# Patient Record
Sex: FEMALE | URBAN - METROPOLITAN AREA
[De-identification: names, ages, dates, MRNs, and addresses within clinical notes are randomized per-mention and may not be internally consistent; named-entity substitution may affect disease eponyms.]

---

## 2024-04-03 ENCOUNTER — LAB ENCOUNTER (OUTPATIENT)
Dept: LAB | Age: 69
End: 2024-04-03
Attending: PHYSICIAN ASSISTANT
Payer: MEDICARE

## 2024-04-03 ENCOUNTER — OFFICE VISIT (OUTPATIENT)
Dept: INTERNAL MEDICINE CLINIC | Facility: CLINIC | Age: 69
End: 2024-04-03
Payer: MEDICARE

## 2024-04-03 VITALS
OXYGEN SATURATION: 94 % | SYSTOLIC BLOOD PRESSURE: 118 MMHG | HEART RATE: 66 BPM | BODY MASS INDEX: 50.02 KG/M2 | HEIGHT: 63.98 IN | RESPIRATION RATE: 18 BRPM | DIASTOLIC BLOOD PRESSURE: 78 MMHG | WEIGHT: 293 LBS

## 2024-04-03 DIAGNOSIS — E78.5 HYPERLIPIDEMIA, UNSPECIFIED HYPERLIPIDEMIA TYPE: ICD-10-CM

## 2024-04-03 DIAGNOSIS — I10 HYPERTENSION, UNSPECIFIED TYPE: ICD-10-CM

## 2024-04-03 DIAGNOSIS — R73.01 IFG (IMPAIRED FASTING GLUCOSE): ICD-10-CM

## 2024-04-03 DIAGNOSIS — G47.33 OSA (OBSTRUCTIVE SLEEP APNEA): ICD-10-CM

## 2024-04-03 DIAGNOSIS — E55.9 VITAMIN D DEFICIENCY: ICD-10-CM

## 2024-04-03 DIAGNOSIS — E66.01 CLASS 3 SEVERE OBESITY WITH SERIOUS COMORBIDITY AND BODY MASS INDEX (BMI) OF 50.0 TO 59.9 IN ADULT, UNSPECIFIED OBESITY TYPE (HCC): ICD-10-CM

## 2024-04-03 DIAGNOSIS — Z91.89 AT INCREASED RISK FOR CARDIOVASCULAR DISEASE: ICD-10-CM

## 2024-04-03 DIAGNOSIS — Z51.81 ENCOUNTER FOR THERAPEUTIC DRUG LEVEL MONITORING: Primary | ICD-10-CM

## 2024-04-03 DIAGNOSIS — Z51.81 ENCOUNTER FOR THERAPEUTIC DRUG LEVEL MONITORING: ICD-10-CM

## 2024-04-03 LAB
ALBUMIN SERPL-MCNC: 3.5 G/DL (ref 3.4–5)
ALBUMIN/GLOB SERPL: 0.9 {RATIO} (ref 1–2)
ALP LIVER SERPL-CCNC: 55 U/L
ALT SERPL-CCNC: 23 U/L
ANION GAP SERPL CALC-SCNC: 5 MMOL/L (ref 0–18)
AST SERPL-CCNC: 15 U/L (ref 15–37)
BASOPHILS # BLD AUTO: 0.09 X10(3) UL (ref 0–0.2)
BASOPHILS NFR BLD AUTO: 0.9 %
BILIRUB SERPL-MCNC: 0.8 MG/DL (ref 0.1–2)
BUN BLD-MCNC: 20 MG/DL (ref 9–23)
CALCIUM BLD-MCNC: 9 MG/DL (ref 8.5–10.1)
CHLORIDE SERPL-SCNC: 108 MMOL/L (ref 98–112)
CHOLEST SERPL-MCNC: 133 MG/DL (ref ?–200)
CO2 SERPL-SCNC: 26 MMOL/L (ref 21–32)
CREAT BLD-MCNC: 1.08 MG/DL
EGFRCR SERPLBLD CKD-EPI 2021: 56 ML/MIN/1.73M2 (ref 60–?)
EOSINOPHIL # BLD AUTO: 0.36 X10(3) UL (ref 0–0.7)
EOSINOPHIL NFR BLD AUTO: 3.7 %
ERYTHROCYTE [DISTWIDTH] IN BLOOD BY AUTOMATED COUNT: 12.7 %
EST. AVERAGE GLUCOSE BLD GHB EST-MCNC: 134 MG/DL (ref 68–126)
FASTING PATIENT LIPID ANSWER: YES
FASTING STATUS PATIENT QL REPORTED: YES
GLOBULIN PLAS-MCNC: 4 G/DL (ref 2.8–4.4)
GLUCOSE BLD-MCNC: 113 MG/DL (ref 70–99)
HBA1C MFR BLD: 6.3 % (ref ?–5.7)
HCT VFR BLD AUTO: 50 %
HDLC SERPL-MCNC: 48 MG/DL (ref 40–59)
HGB BLD-MCNC: 16.7 G/DL
IMM GRANULOCYTES # BLD AUTO: 0.05 X10(3) UL (ref 0–1)
IMM GRANULOCYTES NFR BLD: 0.5 %
LDLC SERPL CALC-MCNC: 64 MG/DL (ref ?–100)
LYMPHOCYTES # BLD AUTO: 2.15 X10(3) UL (ref 1–4)
LYMPHOCYTES NFR BLD AUTO: 21.9 %
MCH RBC QN AUTO: 31.5 PG (ref 26–34)
MCHC RBC AUTO-ENTMCNC: 33.4 G/DL (ref 31–37)
MCV RBC AUTO: 94.2 FL
MONOCYTES # BLD AUTO: 0.74 X10(3) UL (ref 0.1–1)
MONOCYTES NFR BLD AUTO: 7.6 %
NEUTROPHILS # BLD AUTO: 6.41 X10 (3) UL (ref 1.5–7.7)
NEUTROPHILS # BLD AUTO: 6.41 X10(3) UL (ref 1.5–7.7)
NEUTROPHILS NFR BLD AUTO: 65.4 %
NONHDLC SERPL-MCNC: 85 MG/DL (ref ?–130)
OSMOLALITY SERPL CALC.SUM OF ELEC: 291 MOSM/KG (ref 275–295)
PLATELET # BLD AUTO: 301 10(3)UL (ref 150–450)
POTASSIUM SERPL-SCNC: 4.3 MMOL/L (ref 3.5–5.1)
PROT SERPL-MCNC: 7.5 G/DL (ref 6.4–8.2)
RBC # BLD AUTO: 5.31 X10(6)UL
SODIUM SERPL-SCNC: 139 MMOL/L (ref 136–145)
T4 FREE SERPL-MCNC: 1.1 NG/DL (ref 0.8–1.7)
TRIGL SERPL-MCNC: 119 MG/DL (ref 30–149)
TSI SER-ACNC: 3.62 MIU/ML (ref 0.36–3.74)
VIT B12 SERPL-MCNC: 480 PG/ML (ref 193–986)
VIT D+METAB SERPL-MCNC: 36.4 NG/ML (ref 30–100)
VLDLC SERPL CALC-MCNC: 18 MG/DL (ref 0–30)
WBC # BLD AUTO: 9.8 X10(3) UL (ref 4–11)

## 2024-04-03 PROCEDURE — 85025 COMPLETE CBC W/AUTO DIFF WBC: CPT

## 2024-04-03 PROCEDURE — 36415 COLL VENOUS BLD VENIPUNCTURE: CPT

## 2024-04-03 PROCEDURE — 80053 COMPREHEN METABOLIC PANEL: CPT

## 2024-04-03 PROCEDURE — 82607 VITAMIN B-12: CPT

## 2024-04-03 PROCEDURE — 82306 VITAMIN D 25 HYDROXY: CPT

## 2024-04-03 PROCEDURE — 84439 ASSAY OF FREE THYROXINE: CPT

## 2024-04-03 PROCEDURE — 99204 OFFICE O/P NEW MOD 45 MIN: CPT | Performed by: PHYSICIAN ASSISTANT

## 2024-04-03 PROCEDURE — 83036 HEMOGLOBIN GLYCOSYLATED A1C: CPT

## 2024-04-03 PROCEDURE — 84443 ASSAY THYROID STIM HORMONE: CPT

## 2024-04-03 PROCEDURE — 80061 LIPID PANEL: CPT

## 2024-04-03 RX ORDER — OXYBUTYNIN CHLORIDE 10 MG/1
10 TABLET, EXTENDED RELEASE ORAL DAILY
COMMUNITY

## 2024-04-03 NOTE — PROGRESS NOTES
HISTORY OF PRESENT ILLNESS  Chief Complaint   Patient presents with    Weight Problem     Ref by a friend, no prior [facility] management, open to meds       Cecelia Nina is a 68 year old female new to our office today for initiation of medical weight loss program.  Patient presents today with c/o excess weight.       Not getting around like she would like to  Fatigue  Difficulty with walking from one place to the next   Gradual weight gain over the years  Retired when she was 54  Sedentary lifestyle  In summer time will swim    Was trying a type of shots for 4 weeks, then  got sick and had to have surgery    Daughter just moved home with her 12 yr old son and does all the cooking    Goes to [place] in the winter typically    Reason/goal for weight loss: improve endurance and mobility, lose and maintain weight loss    Previous weight loss efforts in the past/medication: dietary and exercise    Interest in Bariatric surgery:     Barriers to weight loss:     Wt Readings from Last 6 Encounters:   04/03/24 (!) 308 lb (139.7 kg)   04/04/13 264 lb 9.6 oz (120 kg)          Breakfast Lunch Dinner Snacks Fluids   Typically doesn't wake up until 11 1/2 bologne sandwich    2 eggs with 2 sausage Soup    Hamburger with mac n cheese  Protein, veggie pretzels Unsweetened ice tea  Cranberry juice  Water     Social hx and lifestyle reviewed:    Work: Retired  Marital status: Yes  Support: Yes  Tobacco use: None  ETOH use: None  Supplements: Probiotic, activated charcoal, melatonin if needed  Exercise: summer swimming  Stress level: High since daughter moved back home  Sleep hours and integrity: sleeps for a few hours and then is awake, ALLI, she does not use her CPAP    MEDICAL HISTORY  PMH reviewed:   Cardiac disorders:negative    Depression/anxiety: Yes  Glaucoma: negative   Kidney stones: Yes, years ago  Eating disorder: negative   Migraines: Prior to menopause  Seizures: negative   Joint-related conditions: multiple joints, knees,  hips  Liver disease: negative   Renal disease: negative   Diabetes: negative  Thyroid disease: negative   Constipation: negative  Miscarriage: negative   DVT: negative    Family or personal history of Pancreatic issues / Medullary Thyroid Cancer: negative    History of bariatric surgery: negative     FMH reviewed: obesity in parent/s or sibling:     REVIEW OF SYSTEMS  GENERAL: feels well otherwise  NECK: denies thickening   LUNGS: denies shortness of breath with exertion, no apnea   CARDIOVASCULAR: denies chest pain on exertion , denies palpitations or pedal edema  GI: denies abdominal pain.  No N/V/D/C  MUSCULOSKELETAL: see above  NEURO: denies headaches   PSYCH: denies change in behavior or mood, denies feeling sad or depressed     EXAM  /78   Pulse 66   Resp 18   Ht 5' 3.98\" (1.625 m)   Wt (!) 308 lb (139.7 kg)   SpO2 94%   BMI 52.91 kg/m² , Percent body fat: F >32%        GENERAL: well developed, well nourished, in no apparent distress  SKIN: warm, pink, dry without rashes to exposed area   EYES: conjunctiva pink, sclera non icteric, PERRL  HEENT: atraumatic, normocephalic, O/p: Mallampati score-   NECK: supple, non tender, no adenopathy, no thyromegaly  LUNGS: CTA in all fields, breathing non labored  CARDIO: RRR without murmur, normal S1 and S2 without clicks or gallops, no pedal edema. EKG not completed in office  GI: rotund, no masses, HSM or tenderness  MUSCULOSKELETAL: grossly intact   NEURO: Oriented times three, full ROM of bilateral UE/LE  PSYCH: pleasant, cooperative, normal mood and affect    Lab Results   Component Value Date    WBC 11.0 (H) 04/04/2013    MCV 93 04/04/2013    MCH 30.9 04/04/2013    MCHC 33.1 04/04/2013    RDW 14.2 04/04/2013     (H) 04/04/2013     Lab Results   Component Value Date    BUN 16 04/04/2013    CREATSERUM 0.89 04/04/2013    ALKPHO 48 04/04/2013    AST 21 04/04/2013    ALT 25 04/04/2013    TP 7.8 04/04/2013    ALB 4.8 04/04/2013    AGRATIO 1.6 04/04/2013      04/04/2013    K 4.8 04/04/2013    CL 99 04/04/2013    CO2 20 04/04/2013     No results found for: \"EAG\", \"A1C\"  Lab Results   Component Value Date    CHOLEST 177 04/04/2013    HDL 47 04/04/2013    LDL 83 04/04/2013    VLDL 47 (H) 04/04/2013     Lab Results   Component Value Date    TSH 3.160 04/04/2013     No results found for: \"B12\", \"VITB12\"  No results found for: \"VITD\", \"QVITD\", \"VQTO22NL\"    Current Outpatient Medications on File Prior to Visit   Medication Sig Dispense Refill    oxybutynin ER 10 MG Oral Tablet 24 Hr Take 1 tablet (10 mg total) by mouth daily.      atenolol (TENORMIN) 100 MG Oral Tab Take 1 tablet (100 mg total) by mouth daily.      escitalopram (LEXAPRO) 20 MG Oral Tab Take 1 tablet (20 mg total) by mouth daily.      Esomeprazole Magnesium (NEXIUM) 40 MG Oral Capsule Delayed Release Take 1 capsule (40 mg total) by mouth every morning before breakfast. As needed       No current facility-administered medications on file prior to visit.       ASSESSMENT  Initial Weight Data and Goal Weight Loss:  Weight Calculations  Initial Weight: 308 lbs  Initial Weight Date: 04/03/24  Today's Weight: 308 lbs  5% Goal: 15.4  10% Goal: 30.8  Total Weight Loss: 0 lbs    Diagnoses and all orders for this visit:    Encounter for therapeutic drug level monitoring  -     CBC With Differential With Platelet; Future  -     Comp Metabolic Panel (14); Future  -     Hemoglobin A1C; Future  -     Lipid Panel; Future  -     TSH and Free T4; Future  -     Vitamin B12; Future  -     Vitamin D; Future  -     OP REFERRAL TO DI[facility]IAN [facility] ([facility] USE ONLY)  -     semaglutide-weight management 0.25 MG/0.5ML Subcutaneous Solution Auto-injector; Inject 0.5 mL (0.25 mg total) into the skin once a week for 4 doses.    Class 3 severe obesity with serious comorbidity and body mass index (BMI) of 50.0 to 59.9 in adult, unspecified obesity type (HCC)  -     CBC With Differential With Platelet; Future  -     Comp Metabolic  Panel (14); Future  -     Hemoglobin A1C; Future  -     Lipid Panel; Future  -     TSH and Free T4; Future  -     Vitamin B12; Future  -     Vitamin D; Future  -     OP REFERRAL TO [facility] ([facility] USE ONLY)  -     semaglutide-weight management 0.25 MG/0.5ML Subcutaneous Solution Auto-injector; Inject 0.5 mL (0.25 mg total) into the skin once a week for 4 doses.    Hypertension, unspecified type  -     CBC With Differential With Platelet; Future  -     Comp Metabolic Panel (14); Future  -     Hemoglobin A1C; Future  -     Lipid Panel; Future  -     TSH and Free T4; Future  -     Vitamin B12; Future  -     Vitamin D; Future  -     OP REFERRAL TO [facility] ([facility] USE ONLY)  -     semaglutide-weight management 0.25 MG/0.5ML Subcutaneous Solution Auto-injector; Inject 0.5 mL (0.25 mg total) into the skin once a week for 4 doses.    IFG (impaired fasting glucose)  -     CBC With Differential With Platelet; Future  -     Comp Metabolic Panel (14); Future  -     Hemoglobin A1C; Future  -     Lipid Panel; Future  -     TSH and Free T4; Future  -     Vitamin B12; Future  -     Vitamin D; Future  -     OP REFERRAL TO [facility] ([facility] USE ONLY)  -     semaglutide-weight management 0.25 MG/0.5ML Subcutaneous Solution Auto-injector; Inject 0.5 mL (0.25 mg total) into the skin once a week for 4 doses.    Hyperlipidemia, unspecified hyperlipidemia type  -     CBC With Differential With Platelet; Future  -     Comp Metabolic Panel (14); Future  -     Hemoglobin A1C; Future  -     Lipid Panel; Future  -     TSH and Free T4; Future  -     Vitamin B12; Future  -     Vitamin D; Future  -     OP REFERRAL TO [facility] ([facility] USE ONLY)  -     semaglutide-weight management 0.25 MG/0.5ML Subcutaneous Solution Auto-injector; Inject 0.5 mL (0.25 mg total) into the skin once a week for 4 doses.    ALLI (obstructive sleep apnea)  -     CBC With Differential With Platelet; Future  -     Comp Metabolic Panel (14); Future  -      Hemoglobin A1C; Future  -     Lipid Panel; Future  -     TSH and Free T4; Future  -     Vitamin B12; Future  -     Vitamin D; Future  -     OP REFERRAL TO [facility] ([facility] USE ONLY)  -     semaglutide-weight management 0.25 MG/0.5ML Subcutaneous Solution Auto-injector; Inject 0.5 mL (0.25 mg total) into the skin once a week for 4 doses.    Vitamin D deficiency  -     CBC With Differential With Platelet; Future  -     Comp Metabolic Panel (14); Future  -     Hemoglobin A1C; Future  -     Lipid Panel; Future  -     TSH and Free T4; Future  -     Vitamin B12; Future  -     Vitamin D; Future  -     OP REFERRAL TO [facility] ([facility] USE ONLY)  -     semaglutide-weight management 0.25 MG/0.5ML Subcutaneous Solution Auto-injector; Inject 0.5 mL (0.25 mg total) into the skin once a week for 4 doses.    At increased risk for cardiovascular disease  -     CBC With Differential With Platelet; Future  -     Comp Metabolic Panel (14); Future  -     Hemoglobin A1C; Future  -     Lipid Panel; Future  -     TSH and Free T4; Future  -     Vitamin B12; Future  -     Vitamin D; Future  -     OP REFERRAL TO DI[facility]IAN [facility] ([facility] USE ONLY)  -     semaglutide-weight management 0.25 MG/0.5ML Subcutaneous Solution Auto-injector; Inject 0.5 mL (0.25 mg total) into the skin once a week for 4 doses.        PLAN  Initial Weight: 308 lbs  Initial Weight Date: 04/03/24  Today's Weight: 308 lbs  5% Goal: 15.4  10% Goal: 30.8  Total Weight Loss: 0 lbs    Will begin Wegovy 0.25mg weekly - denies any personal or family history of pancreatitis, pancreatic cancer, thyroid cancer, MEN2 - discussed MOA, advised side effects and adverse effects of medication.  Discussed if Wegovy not covered will start metformin  Pt to get lab work done  HTN - blood pressure well controlled on current medication - advised signs and symptoms of hypotension and will monitor with continued weight loss  ALLI, discussed importance of CPAP compliance  HLD -  lifestyle changes, will check labs  Begin logging foods - discussed macronutrient goals  -low carb high protein  -portion control  -Limit carbohydrates  -Eat breakfast every day   -Don't skip meals   -Read nutrition labels and keep a food log  -drink a lot of water 65 oz of water per day  - Do not drink your calories (no soda, juice, high calorie coffee drinks, limit alcohol)  - Do not eat late at night  Medication use and side effects reviewed with patient.  Medication contraindications: stimulant  Follow up with dietitian and psychologist as recommended.  Discussed the role of sleep and stress in weight management.  Labs orders as above.  Counseled on comprehensive weight loss plan including attention to nutrition, exercise and behavior/stress management for success. See patient instruction below for more details.  Weight Loss Consent to treat reviewed and signed.    Total time spent on chart review, pre-charting, obtaining history, counseling, and educating, reviewing labs was 45 minutes.      Patient Instructions   1700 - 1800 calories a day  Moderate Carb (30/35/35)   133g   protein     69g   fats     155g   carbs   Lower Carb (40/40/20)   177g   protein     79g   fats     88g   carbs   We are here to support you with weight loss, but please remember that you still need your primary care provider for your routine health maintenance.      PLAN:    Follow up with me in 3 months  Schedule follow up appointments: Zoltan Butler (dietitian) or Josie Hernandez (presurgery dietitian)   Check for insurance coverage for dietitian and labwork prior to scheduling appointment.     Please try to work on the following dietary changes:  Goals: Aim for 20-30 grams of protein/ meal  Eat 4-6 vegetables/day  Avoid skipping meals- eat every 4-5 hours  Aim for 3 meals/day  2. Drink lots of water and cut down on soda/juice consumption if soda/juice drinker  3. Focus on protein: (15-30 grams with each meal) ie. greek yogurt, cottage  cheese, string cheese, hard boiled eggs  4. Healthy snacks: peanut butter and apples, hummus and carrots, berries, nuts (1/4 cup), tuna and crackers                 Protein Shakes: Premier protein or Core Power                Protein Bars: Rx Bars, Oatmega, Power Crunch                 [place] balanced breaks (cheese and nuts)- without chocolate  5. Reduce carbohydrates which includes sweets as well as rice, pasta, potatoes, bread, corn and instead choose whole grain options or more protein or vegetables (4-6 servings of vegetables per day)  6. Get a good night of sleep  7. Try to decrease stress in life     Please download apps:  1. My Fitness Pal, Lose it, My Net Diary edward to help you to monitor daily dietary intake and you will be able to see if you are eating the right amount of calories, protein, carbs                With My Fitness Pal-->When you set-up the edward or need to adjust settings:                Goals should include:                 Lose 1.5-2 lbs per week                Activity level: not very active (can't count exercise towards calorie number per day)                   ** Daily INPUT> Look at nutrition section-- \"nutrients\" and it will break down your macros for the day (ie. Protein, carbs, fibers, sugars and fats). Try to stay within these numbers daily     2. \"7 minute workout\" to help with exercise/activity which takes 7 minutes of your day and that you can do at home!   3. \"Calm\" or \"Headspace\" which helps with mindfulness, meditation, clarity, sleep, and tami to your daily life.   4. [employer] blog for healthy recipe ideas  5. GRAVIDI for low carb resources    HIGH PROTEIN SNACK IDEAS  -cottage cheese  -plain yogurt  -kefir  -hard-boiled eggs  -natural cheeses  -nuts (measure portion size)   -unsweetened nut butters  -dried edamame   -aleksandr seeds soaked in water or almond milk  -soy nuts  -cured meats (monitor for sodium issues)   -hummus with vegetables  -bean dip with vegetables      FRUIT  Low carb fruit options   Raspberries: Half a cup (60 grams) contains 3 grams of carbs.  Blackberries: Half a cup (70 grams) contains 4 grams of carbs.  Strawberries: Half a cup (100 grams) contains 6 grams of carbs.  Blueberries: Half a cup (50 grams) contains 6 grams of carbs.  Plum: One medium-sized (80 grams) contains 6 grams of carbs.     VEGETABLES  Low carb vegetables            Delicious and Healthy Snacks      All snacks are under 200 calories and chocked full of nutrition!  Quick Caprese salad- Mix 1 cup grape tomatoes, 1 oz. fresh mini mozzarella balls, 1 teaspoon olive oil, ½ tsp. balsamic vinegar.  Add fresh or dried basil for flavor.  Cottage cheese and berries- Top ½ cup low fat cottage cheese with 1/2 cup of blueberries  Peanut butter and apple slices- Cut up an apple and dip in 1 Tablespoon of peanut butter  Hummus and veggies- Dip baby carrots, pepper strips or snap peas in ¼ cup of hummus  Nuts- Munch on 1 ounce of any kind of nut (about ¼ cup)  Wrap it up- Wrap 2 ounces of low sodium, nitrate free turkey around red pepper or cucumber slices  Yogurt and berries- 1/2 cup berries on a 6 ounce container of plain or low sugar fruit flavored 2% Greek yogurt (less than 15 grams sugar per serving).  Chobani (Less Sugar)® or Siggis® are examples.  Hardboiled egg and fruit- 1 hardboiled egg and a tangerine or other small serving of fruit  Tuna and avocado- Put 2 oz tuna (one pouch) on 1/3 of an avocado or 2 Tbsp guacamole and top with salsa  String cheese and veggies- one piece cheese (3/4 ounce) and 1 cup snap peas or other vegetables  Cauliflower rice and cheese- Sprinkle 1/4 cup shredded cheese on 1 cup cauliflower rice and microwave until cheese melts  Deviled eggs- 3 deviled egg halves  Bean dip and veggies-½ cup refried beans with ¼ cup shredded cheese melted on top. Use as a dip for celery or red peppers strips or just eat plain  [place] Balanced Breaks® (or make your own-1/2 ounce nuts,  1/2 ounce cheese and 1 teaspoon dried fruit)  Edamame-1 cup warmed and lightly salted  Low fat chicken, egg, or tuna salad- Mix 2 oz chicken, tuna, or 2 eggs with 1 tsp aguero,1 tsp [place] mustard and 1 tsp plain yogurt.  Add chopped celery, onions, walnuts, Granny smith apples or dried cranberries to make it interesting.  [place] break- Turkey, chicken, peanut butter or almond butter on 1 slice whole grain bread   Protein shake-Clark®, Core Power®, Orgain®, Premier Protein®  Protein bar-Quest ®, Oatmega®, RX Bar®      No follow-ups on file.    Patient verbalizes understanding.    Zaynab Thapa PA-C  4/3/2024

## 2024-04-03 NOTE — PATIENT INSTRUCTIONS
1700 - 1800 calories a day  Moderate Carb (30/35/35)   133g   protein     69g   fats     155g   carbs   Lower Carb (40/40/20)   177g   protein     79g   fats     88g   carbs   We are here to support you with weight loss, but please remember that you still need your primary care provider for your routine health maintenance.      PLAN:    Follow up with me in 3 months  Schedule follow up appointments: Zoltan Butler (dietitian) or Josie Hernandez (presurgery dietitian)   Check for insurance coverage for dietitian and labwork prior to scheduling appointment.     Please try to work on the following dietary changes:  Goals: Aim for 20-30 grams of protein/ meal  Eat 4-6 vegetables/day  Avoid skipping meals- eat every 4-5 hours  Aim for 3 meals/day  2. Drink lots of water and cut down on soda/juice consumption if soda/juice drinker  3. Focus on protein: (15-30 grams with each meal) ie. greek yogurt, cottage cheese, string cheese, hard boiled eggs  4. Healthy snacks: peanut butter and apples, hummus and carrots, berries, nuts (1/4 cup), tuna and crackers                 Protein Shakes: Premier protein or Core Power                Protein Bars: Rx Bars, Oatmega, Power Crunch                 [place] balanced breaks (cheese and nuts)- without chocolate  5. Reduce carbohydrates which includes sweets as well as rice, pasta, potatoes, bread, corn and instead choose whole grain options or more protein or vegetables (4-6 servings of vegetables per day)  6. Get a good night of sleep  7. Try to decrease stress in life     Please download apps:  1. My Fitness Pal, Lose it, My Net Diary edward to help you to monitor daily dietary intake and you will be able to see if you are eating the right amount of calories, protein, carbs                With My Fitness Pal-->When you set-up the edward or need to adjust settings:                Goals should include:                 Lose 1.5-2 lbs per week                Activity level: not very active (can't  count exercise towards calorie number per day)                   ** Daily INPUT> Look at nutrition section-- \"nutrients\" and it will break down your macros for the day (ie. Protein, carbs, fibers, sugars and fats). Try to stay within these numbers daily     2. \"7 minute workout\" to help with exercise/activity which takes 7 minutes of your day and that you can do at home!   3. \"Calm\" or \"Headspace\" which helps with mindfulness, meditation, clarity, sleep, and tami to your daily life.   4. [employer] blog for healthy recipe ideas  5. [employer] for low carb resources    HIGH PROTEIN SNACK IDEAS  -cottage cheese  -plain yogurt  -kefir  -hard-boiled eggs  -natural cheeses  -nuts (measure portion size)   -unsweetened nut butters  -dried edamame   -aleksandr seeds soaked in water or almond milk  -soy nuts  -cured meats (monitor for sodium issues)   -hummus with vegetables  -bean dip with vegetables     FRUIT  Low carb fruit options   Raspberries: Half a cup (60 grams) contains 3 grams of carbs.  Blackberries: Half a cup (70 grams) contains 4 grams of carbs.  Strawberries: Half a cup (100 grams) contains 6 grams of carbs.  Blueberries: Half a cup (50 grams) contains 6 grams of carbs.  Plum: One medium-sized (80 grams) contains 6 grams of carbs.     VEGETABLES  Low carb vegetables            Delicious and Healthy Snacks      All snacks are under 200 calories and chocked full of nutrition!  Quick Caprese salad- Mix 1 cup grape tomatoes, 1 oz. fresh mini mozzarella balls, 1 teaspoon olive oil, ½ tsp. balsamic vinegar.  Add fresh or dried basil for flavor.  Cottage cheese and berries- Top ½ cup low fat cottage cheese with 1/2 cup of blueberries  Peanut butter and apple slices- Cut up an apple and dip in 1 Tablespoon of peanut butter  Hummus and veggies- Dip baby carrots, pepper strips or snap peas in ¼ cup of hummus  Nuts- Munch on 1 ounce of any kind of nut (about ¼ cup)  Wrap it up- Wrap 2 ounces of low sodium, nitrate free  turkey around red pepper or cucumber slices  Yogurt and berries- 1/2 cup berries on a 6 ounce container of plain or low sugar fruit flavored 2% Greek yogurt (less than 15 grams sugar per serving).  Chobani (Less Sugar)® or Siggis® are examples.  Hardboiled egg and fruit- 1 hardboiled egg and a tangerine or other small serving of fruit  Tuna and avocado- Put 2 oz tuna (one pouch) on 1/3 of an avocado or 2 Tbsp guacamole and top with salsa  String cheese and veggies- one piece cheese (3/4 ounce) and 1 cup snap peas or other vegetables  Cauliflower rice and cheese- Sprinkle 1/4 cup shredded cheese on 1 cup cauliflower rice and microwave until cheese melts  Deviled eggs- 3 deviled egg halves  Bean dip and veggies-½ cup refried beans with ¼ cup shredded cheese melted on top. Use as a dip for celery or red peppers strips or just eat plain  [place] Balanced Breaks® (or make your own-1/2 ounce nuts, 1/2 ounce cheese and 1 teaspoon dried fruit)  Edamame-1 cup warmed and lightly salted  Low fat chicken, egg, or tuna salad- Mix 2 oz chicken, tuna, or 2 eggs with 1 tsp aguero,1 tsp [place] mustard and 1 tsp plain yogurt.  Add chopped celery, onions, walnuts, Granny smith apples or dried cranberries to make it interesting.  [place] break- Turkey, chicken, peanut butter or almond butter on 1 slice whole grain bread   Protein shake-Clark®, Core Power®, Orgain®, Premier Protein®  Protein bar-Quest ®, Oatmega®, RX Bar®

## 2024-04-05 ENCOUNTER — TELEPHONE (OUTPATIENT)
Dept: INTERNAL MEDICINE CLINIC | Facility: CLINIC | Age: 69
End: 2024-04-05

## 2024-04-09 NOTE — TELEPHONE ENCOUNTER
Wegovy was denied.  Medicare Part D benefit doesn't cover drugs for weight loss medication. Please advise for alternative plan.

## 2024-04-17 RX ORDER — METFORMIN HYDROCHLORIDE 750 MG/1
750 TABLET, EXTENDED RELEASE ORAL DAILY
Qty: 90 TABLET | Refills: 0 | Status: SHIPPED | OUTPATIENT
Start: 2024-04-17

## 2024-07-15 NOTE — TELEPHONE ENCOUNTER
Requesting   Requested Prescriptions     Pending Prescriptions Disp Refills    METFORMIN  MG Oral Tablet 24 Hr [Pharmacy Med Name: METFORMIN HCL  MG TABLET] 90 tablet 0     Sig: TAKE 1 TABLET BY MOUTH EVERY DAY       LOV: 4/3/24  RTC:   Last Relevant Labs:   Filled: 4/17/24 #90 with 0 refills    Future Appointments   Date Time Provider Department Center   8/7/2024 10:40 AM Zaynab Thapa PA-C [id number] [address]

## 2024-07-16 RX ORDER — METFORMIN HYDROCHLORIDE 750 MG/1
750 TABLET, EXTENDED RELEASE ORAL DAILY
Qty: 90 TABLET | Refills: 0 | Status: SHIPPED | OUTPATIENT
Start: 2024-07-16

## 2024-10-23 NOTE — TELEPHONE ENCOUNTER
Requesting   Requested Prescriptions     Pending Prescriptions Disp Refills    METFORMIN  MG Oral Tablet 24 Hr [Pharmacy Med Name: METFORMIN HCL  MG TABLET] 90 tablet 0     Sig: TAKE 1 TABLET BY MOUTH EVERY DAY      LOV: 04/03/24  RTC:   Last Relevant Labs:   Filled: 07/16/24 #90 with 0 refills    No future appointments.

## 2024-10-24 RX ORDER — METFORMIN HYDROCHLORIDE 750 MG/1
750 TABLET, EXTENDED RELEASE ORAL DAILY
Qty: 90 TABLET | Refills: 0 | Status: SHIPPED | OUTPATIENT
Start: 2024-10-24

## 2025-01-21 RX ORDER — METFORMIN HYDROCHLORIDE 750 MG/1
750 TABLET, EXTENDED RELEASE ORAL DAILY
Qty: 90 TABLET | Refills: 0 | OUTPATIENT
Start: 2025-01-21

## 2025-01-21 NOTE — TELEPHONE ENCOUNTER
Requesting   Requested Prescriptions     Pending Prescriptions Disp Refills    METFORMIN  MG Oral Tablet 24 Hr [Pharmacy Med Name: METFORMIN HCL  MG TABLET] 90 tablet 0     Sig: TAKE 1 TABLET BY MOUTH EVERY DAY      LOV: 04/03/24  RTC: 3-6mo  Last Relevant Labs:   Filled: 10/24/24 #90 with 0 refills    No future appointments.